# Patient Record
Sex: MALE | Race: WHITE | NOT HISPANIC OR LATINO | URBAN - METROPOLITAN AREA
[De-identification: names, ages, dates, MRNs, and addresses within clinical notes are randomized per-mention and may not be internally consistent; named-entity substitution may affect disease eponyms.]

---

## 2019-03-11 ENCOUNTER — IMPORTED ENCOUNTER (OUTPATIENT)
Dept: URBAN - METROPOLITAN AREA CLINIC 50 | Facility: CLINIC | Age: 69
End: 2019-03-11

## 2019-03-12 ENCOUNTER — IMPORTED ENCOUNTER (OUTPATIENT)
Dept: URBAN - METROPOLITAN AREA CLINIC 50 | Facility: CLINIC | Age: 69
End: 2019-03-12

## 2019-03-15 ENCOUNTER — IMPORTED ENCOUNTER (OUTPATIENT)
Dept: URBAN - METROPOLITAN AREA CLINIC 50 | Facility: CLINIC | Age: 69
End: 2019-03-15

## 2019-10-01 ENCOUNTER — IMPORTED ENCOUNTER (OUTPATIENT)
Dept: URBAN - METROPOLITAN AREA CLINIC 50 | Facility: CLINIC | Age: 69
End: 2019-10-01

## 2020-01-08 ENCOUNTER — IMPORTED ENCOUNTER (OUTPATIENT)
Dept: URBAN - METROPOLITAN AREA CLINIC 50 | Facility: CLINIC | Age: 70
End: 2020-01-08

## 2020-03-25 ENCOUNTER — IMPORTED ENCOUNTER (OUTPATIENT)
Dept: URBAN - METROPOLITAN AREA CLINIC 50 | Facility: CLINIC | Age: 70
End: 2020-03-25

## 2020-07-08 ENCOUNTER — IMPORTED ENCOUNTER (OUTPATIENT)
Dept: URBAN - METROPOLITAN AREA CLINIC 50 | Facility: CLINIC | Age: 70
End: 2020-07-08

## 2020-07-10 ENCOUNTER — IMPORTED ENCOUNTER (OUTPATIENT)
Dept: URBAN - METROPOLITAN AREA CLINIC 50 | Facility: CLINIC | Age: 70
End: 2020-07-10

## 2020-07-15 ENCOUNTER — IMPORTED ENCOUNTER (OUTPATIENT)
Dept: URBAN - METROPOLITAN AREA CLINIC 50 | Facility: CLINIC | Age: 70
End: 2020-07-15

## 2020-07-15 NOTE — PATIENT DISCUSSION
"""Hx.Myopic Lasik OU "" ""Phaco with IOL OS: 07/23/2020 Tecnis ZCB00 19.0 (ORA) Target: [facility] [place]

## 2020-07-23 ENCOUNTER — IMPORTED ENCOUNTER (OUTPATIENT)
Dept: URBAN - METROPOLITAN AREA CLINIC 50 | Facility: CLINIC | Age: 70
End: 2020-07-23

## 2020-07-23 NOTE — PATIENT DISCUSSION
"""S/P IOL OS: Tecnis ZCB00 19.0 (ORA) (Target: [place]) +ORA/Femto/Arcs +Omidria. Continue post operative instructions and drops per schedule.  """

## 2020-07-31 ENCOUNTER — IMPORTED ENCOUNTER (OUTPATIENT)
Dept: URBAN - METROPOLITAN AREA CLINIC 50 | Facility: CLINIC | Age: 70
End: 2020-07-31

## 2020-07-31 NOTE — PATIENT DISCUSSION
"""S/P IOL OS: Tecnis ZCB00 19.0 (ORA) (Target: [place]) +ORA/Femto/Arcs +Omidria. Continue post operative instructions and drops per schedule.  """

## 2020-08-06 ENCOUNTER — IMPORTED ENCOUNTER (OUTPATIENT)
Dept: URBAN - METROPOLITAN AREA CLINIC 50 | Facility: CLINIC | Age: 70
End: 2020-08-06

## 2020-08-06 PROBLEM — Z96.1: Noted: 2020-07-23

## 2020-08-06 PROBLEM — Z96.1: Noted: 2020-08-06

## 2020-08-06 NOTE — PATIENT DISCUSSION
"""S/P IOL OD: Tecnis [id number] 19. 5(ORA) @ 178Âº (Target: [place]) +ORA/Femto +Omidria. Continue post operative instructions and drops per schedule.  """

## 2020-08-11 ENCOUNTER — IMPORTED ENCOUNTER (OUTPATIENT)
Dept: URBAN - METROPOLITAN AREA CLINIC 50 | Facility: CLINIC | Age: 70
End: 2020-08-11

## 2020-08-11 NOTE — PATIENT DISCUSSION
"""S/P IOL OD: Tecnis [id number] 19. 5(ORA) @ 178Âº (Target: [place]) +ORA/Femto +Omidria. Continue post operative instructions and drops per schedule.  """

## 2020-09-11 ENCOUNTER — IMPORTED ENCOUNTER (OUTPATIENT)
Dept: URBAN - METROPOLITAN AREA CLINIC 50 | Facility: CLINIC | Age: 70
End: 2020-09-11

## 2020-12-01 ENCOUNTER — IMPORTED ENCOUNTER (OUTPATIENT)
Dept: URBAN - METROPOLITAN AREA CLINIC 50 | Facility: CLINIC | Age: 70
End: 2020-12-01

## 2020-12-22 ENCOUNTER — IMPORTED ENCOUNTER (OUTPATIENT)
Dept: URBAN - METROPOLITAN AREA CLINIC 50 | Facility: CLINIC | Age: 70
End: 2020-12-22

## 2020-12-30 ENCOUNTER — IMPORTED ENCOUNTER (OUTPATIENT)
Dept: URBAN - METROPOLITAN AREA CLINIC 50 | Facility: CLINIC | Age: 70
End: 2020-12-30

## 2021-01-27 ENCOUNTER — IMPORTED ENCOUNTER (OUTPATIENT)
Dept: URBAN - METROPOLITAN AREA CLINIC 50 | Facility: CLINIC | Age: 71
End: 2021-01-27

## 2021-02-09 ENCOUNTER — IMPORTED ENCOUNTER (OUTPATIENT)
Dept: URBAN - METROPOLITAN AREA CLINIC 50 | Facility: CLINIC | Age: 71
End: 2021-02-09

## 2021-04-17 ASSESSMENT — VISUAL ACUITY
OD_OTHER: 20/25. 20/40.
OS_BAT: 20/20
OD_BAT: 20/60-
OS_CC: J1@ 15 IN
OD_SC: 20/20-2
OD_BAT: 20/60-
OD_SC: 20/20-1
OD_OTHER: 20/25. 20/25.
OD_BAT: 20/70
OD_BAT: 20/25
OS_CC: 20/200
OS_PH: 20/40
OS_SC: 20/20
OD_SC: 20/30
OD_SC: 20/100-
OD_BAT: 20/25
OD_BAT: 20/30
OS_OTHER: 20/400.
OS_CC: J1@ 16 IN
OD_CC: J1@ 15 IN
OS_SC: 20/20-
OD_SC: 20/20-
OD_OTHER: 20/60-. >20/400.
OS_SC: 20/25
OS_SC: 20/20
OD_OTHER: 20/60-. >20/400.
OS_CC: J2
OD_OTHER: 20/70. 20/100.
OD_SC: 20/20
OS_OTHER: 20/25. 20/40.
OS_BAT: 20/30
OD_CC: J1+@ 14 IN
OS_SC: 20/100
OS_OTHER: >20/400. >20/400.
OS_SC: 20/70-2
OS_CC: J1+@ 14 IN
OD_CC: J2
OD_OTHER: 20/30. 20/70.
OS_OTHER: 20/20. 20/25.
OS_SC: 20/80
OS_SC: 20/20
OS_BAT: >20/400
OD_OTHER: 20/60-. >20/400.
OS_BAT: 20/400
OD_SC: 20/25+
OS_BAT: 20/25
OS_BAT: >20/400
OD_CC: J1@ 16 IN
OS_OTHER: 20/30. 20/70.
OD_SC: 20/25
OD_BAT: 20/60-
OS_OTHER: >20/400.
OS_SC: 20/25
OD_SC: 20/20
OD_SC: 20/30-1
OD_SC: 20/25+2

## 2021-04-17 ASSESSMENT — TONOMETRY
OS_IOP_MMHG: 16
OD_IOP_MMHG: 17
OS_IOP_MMHG: 16
OD_IOP_MMHG: 17
OS_IOP_MMHG: 16
OS_IOP_MMHG: 17
OS_IOP_MMHG: 17
OS_IOP_MMHG: 22
OD_IOP_MMHG: 16
OD_IOP_MMHG: 18
OD_IOP_MMHG: 16
OS_IOP_MMHG: 17
OD_IOP_MMHG: 18
OD_IOP_MMHG: 17
OS_IOP_MMHG: 17
OS_IOP_MMHG: 16
OD_IOP_MMHG: 16
OS_IOP_MMHG: 16
OS_IOP_MMHG: 16
OD_IOP_MMHG: 21

## 2021-05-28 ENCOUNTER — PREPPED CHART (OUTPATIENT)
Dept: URBAN - METROPOLITAN AREA CLINIC 53 | Facility: CLINIC | Age: 71
End: 2021-05-28

## 2021-06-01 ENCOUNTER — COMPREHENSIVE EXAM (OUTPATIENT)
Dept: URBAN - METROPOLITAN AREA CLINIC 53 | Facility: CLINIC | Age: 71
End: 2021-06-01

## 2021-06-01 DIAGNOSIS — H35.371: ICD-10-CM

## 2021-06-01 DIAGNOSIS — H43.813: ICD-10-CM

## 2021-06-01 PROCEDURE — 92014 COMPRE OPH EXAM EST PT 1/>: CPT

## 2021-06-01 PROCEDURE — 92134 CPTRZ OPH DX IMG PST SGM RTA: CPT

## 2021-06-01 ASSESSMENT — VISUAL ACUITY
OU_SC: J2
OS_SC: 20/20-
OD_SC: 20/25

## 2021-06-01 ASSESSMENT — TONOMETRY
OS_IOP_MMHG: 16
OD_IOP_MMHG: 16

## 2021-06-01 NOTE — PATIENT DISCUSSION
Next appointment with Dr. Josseline Combs is next week. If he notices any signs of a retinal detachment or tear advised to call and schedule sooner.

## 2021-06-01 NOTE — PATIENT DISCUSSION
Scattered DBH likely secondary to diabetes. A1c has been well controlled and will be rechecked soon. Will continue to monitor.

## 2021-06-01 NOTE — PATIENT DISCUSSION
NPDR: The patient has clinical evidence of non-proliferative diabetic retinopathy. It was explained to the patient that disease progression is common and repeat examination within 6 to 12 months to monitor for progression was advised. The patient was encouraged to continue to manage their weight, diet, exercise, blood pressure and blood glucose. The patient should continue to follow up with their primary care physician and work to optimally control their Hgb A1c.

## 2021-12-03 ENCOUNTER — COMPREHENSIVE EXAM (OUTPATIENT)
Dept: URBAN - METROPOLITAN AREA CLINIC 53 | Facility: CLINIC | Age: 71
End: 2021-12-03

## 2021-12-03 DIAGNOSIS — E11.3293: ICD-10-CM

## 2021-12-03 DIAGNOSIS — H43.813: ICD-10-CM

## 2021-12-03 DIAGNOSIS — H35.371: ICD-10-CM

## 2021-12-03 DIAGNOSIS — H35.63: ICD-10-CM

## 2021-12-03 PROCEDURE — 92134 CPTRZ OPH DX IMG PST SGM RTA: CPT

## 2021-12-03 PROCEDURE — 92014 COMPRE OPH EXAM EST PT 1/>: CPT

## 2021-12-03 ASSESSMENT — TONOMETRY
OD_IOP_MMHG: 14
OS_IOP_MMHG: 13

## 2021-12-03 ASSESSMENT — VISUAL ACUITY
OS_SC: J5
OS_SC: 20/20
OD_SC: J5
OD_SC: 20/20

## 2021-12-03 NOTE — PATIENT DISCUSSION
Next appointment with Dr. Lew Becerra is scheduled for 12/21/2021. If he notices any signs of a retinal detachment or tear advised to call and schedule sooner. Advised to keep all upcoming appointments with retina. Will have him schedule where he is seeing either Dr. Lew Becerra or us every 3 months.

## 2022-03-04 ENCOUNTER — ESTABLISHED PATIENT (OUTPATIENT)
Dept: URBAN - METROPOLITAN AREA CLINIC 53 | Facility: CLINIC | Age: 72
End: 2022-03-04

## 2022-03-04 DIAGNOSIS — H43.813: ICD-10-CM

## 2022-03-04 DIAGNOSIS — H35.63: ICD-10-CM

## 2022-03-04 DIAGNOSIS — E11.3293: ICD-10-CM

## 2022-03-04 DIAGNOSIS — H35.371: ICD-10-CM

## 2022-03-04 PROCEDURE — 92014 COMPRE OPH EXAM EST PT 1/>: CPT

## 2022-03-04 PROCEDURE — 92134 CPTRZ OPH DX IMG PST SGM RTA: CPT

## 2022-03-04 ASSESSMENT — VISUAL ACUITY
OS_SC: 20/25+1
OD_SC: 20/20-1
OU_SC: 20/20

## 2022-03-04 ASSESSMENT — TONOMETRY
OS_IOP_MMHG: 17
OD_IOP_MMHG: 17

## 2022-03-04 NOTE — PATIENT DISCUSSION
Patient was given a stable/improved visit last time around and will be seeing him in about 9 months. Will schedule him back in 3 months to get him on a being seen every 6 month by us and retina.

## 2022-06-17 ENCOUNTER — ESTABLISHED PATIENT (OUTPATIENT)
Dept: URBAN - METROPOLITAN AREA CLINIC 53 | Facility: CLINIC | Age: 72
End: 2022-06-17

## 2022-06-17 DIAGNOSIS — E11.3291: ICD-10-CM

## 2022-06-17 DIAGNOSIS — H43.813: ICD-10-CM

## 2022-06-17 DIAGNOSIS — H35.342: ICD-10-CM

## 2022-06-17 DIAGNOSIS — H35.373: ICD-10-CM

## 2022-06-17 DIAGNOSIS — E11.3212: ICD-10-CM

## 2022-06-17 DIAGNOSIS — H35.63: ICD-10-CM

## 2022-06-17 DIAGNOSIS — E11.9: ICD-10-CM

## 2022-06-17 PROCEDURE — 92014 COMPRE OPH EXAM EST PT 1/>: CPT

## 2022-06-17 PROCEDURE — 92134 CPTRZ OPH DX IMG PST SGM RTA: CPT

## 2022-06-17 ASSESSMENT — TONOMETRY
OS_IOP_MMHG: 17
OD_IOP_MMHG: 17

## 2022-06-17 ASSESSMENT — VISUAL ACUITY
OS_SC: 20/25
OD_SC: J5
OU_SC: 20/20
OD_SC: 20/25
OU_SC: J1
OS_SC: J2

## 2022-06-17 NOTE — PATIENT DISCUSSION
Patient is followed by Dr. Carlene Bolaños. At patients last visit at [facility] this was noted. Will send [facility] update.

## 2023-06-27 ENCOUNTER — COMPREHENSIVE EXAM (OUTPATIENT)
Dept: URBAN - METROPOLITAN AREA CLINIC 52 | Facility: CLINIC | Age: 73
End: 2023-06-27

## 2023-06-27 DIAGNOSIS — H35.341: ICD-10-CM

## 2023-06-27 DIAGNOSIS — E11.9: ICD-10-CM

## 2023-06-27 PROCEDURE — 92014 COMPRE OPH EXAM EST PT 1/>: CPT

## 2023-06-27 PROCEDURE — 92134 CPTRZ OPH DX IMG PST SGM RTA: CPT

## 2023-06-27 ASSESSMENT — VISUAL ACUITY
OU_SC: 20/20-1
OD_SC: 20/20
OU_SC: 20/20"16IN
OS_SC: 20/25-1

## 2023-06-27 ASSESSMENT — KERATOMETRY
OS_K2POWER_DIOPTERS: 39.25
OS_AXISANGLE2_DEGREES: 122
OD_K1POWER_DIOPTERS: 38.00
OS_AXISANGLE_DEGREES: 032
OS_K1POWER_DIOPTERS: 39.00
OD_AXISANGLE2_DEGREES: 170
OD_K2POWER_DIOPTERS: 40.25
OD_AXISANGLE_DEGREES: 080

## 2023-06-27 ASSESSMENT — TONOMETRY
OD_IOP_MMHG: 16
OS_IOP_MMHG: 18

## 2024-06-06 ENCOUNTER — APPOINTMENT (RX ONLY)
Dept: URBAN - METROPOLITAN AREA CLINIC 342 | Facility: CLINIC | Age: 74
Setting detail: DERMATOLOGY
End: 2024-06-06

## 2024-06-06 DIAGNOSIS — L91.8 OTHER HYPERTROPHIC DISORDERS OF THE SKIN: ICD-10-CM

## 2024-06-06 DIAGNOSIS — L30.0 NUMMULAR DERMATITIS: ICD-10-CM

## 2024-06-06 PROCEDURE — ? ITCH-SCRATCH CYCLE COUNSELING

## 2024-06-06 PROCEDURE — ? PRESCRIPTION

## 2024-06-06 PROCEDURE — 99204 OFFICE O/P NEW MOD 45 MIN: CPT

## 2024-06-06 PROCEDURE — ? COUNSELING

## 2024-06-06 PROCEDURE — ? PRESCRIPTION SAMPLES PROVIDED

## 2024-06-06 PROCEDURE — ? PRESCRIPTION MEDICATION MANAGEMENT

## 2024-06-06 RX ORDER — CLOBETASOL PROPIONATE 0.25 MG/G
1 CREAM TOPICAL BID
Qty: 100 | Refills: 0 | Status: ERX | COMMUNITY
Start: 2024-06-06

## 2024-06-06 RX ADMIN — CLOBETASOL PROPIONATE 1: 0.25 CREAM TOPICAL at 00:00

## 2024-06-06 ASSESSMENT — LOCATION DETAILED DESCRIPTION DERM
LOCATION DETAILED: RIGHT PROXIMAL PRETIBIAL REGION
LOCATION DETAILED: LEFT PROXIMAL PRETIBIAL REGION
LOCATION DETAILED: LEFT PROXIMAL DORSAL FOREARM
LOCATION DETAILED: RIGHT DISTAL DORSAL FOREARM

## 2024-06-06 ASSESSMENT — LOCATION ZONE DERM
LOCATION ZONE: ARM
LOCATION ZONE: LEG

## 2024-06-06 ASSESSMENT — LOCATION SIMPLE DESCRIPTION DERM
LOCATION SIMPLE: LEFT PRETIBIAL REGION
LOCATION SIMPLE: RIGHT FOREARM
LOCATION SIMPLE: RIGHT PRETIBIAL REGION
LOCATION SIMPLE: LEFT FOREARM

## 2024-06-06 ASSESSMENT — ITCH NUMERIC RATING SCALE: HOW SEVERE IS YOUR ITCHING?: 8

## 2024-06-06 NOTE — HPI: RASH
How Severe Is Your Rash?: moderate
Is This A New Presentation, Or A Follow-Up?: Rash
Additional History: Patient was given triamcinolone ointment bid x 7 days and prednisone x7 days. Was improving however medication stopped rash spread to arms.

## 2024-06-06 NOTE — PROCEDURE: PRESCRIPTION SAMPLES PROVIDED
Detail Level: Zone
Expiration Date (Optional): 3/2025
Samples Given: Impoyz
Lot/Batch Number (Optional): [id number]

## 2024-06-06 NOTE — PROCEDURE: PRESCRIPTION MEDICATION MANAGEMENT
Initiate Treatment: Impoyz 0.025 % topical cream Bid\\nQuantity: 100.0 g  Days Supply: 30\\nSig: AAA on arms and legs twice daily x 2-4 then discontinue. Do not apply to face, groin or underarms\\n\\n\\nSaran anti itch-may use as much as needed(can store in fridge), cool packs\\n\\nGentle cleansers
Discontinue Regimen: Sami spring
Samples Given: Gentle cleansers
Render In Strict Bullet Format?: No
Detail Level: Zone

## 2024-06-11 ENCOUNTER — RX ONLY (OUTPATIENT)
Age: 74
Setting detail: RX ONLY
End: 2024-06-11

## 2024-06-11 RX ORDER — CLOBETASOL PROPIONATE 0.25 MG/G
1 CREAM TOPICAL BID
Qty: 100 | Refills: 0 | Status: ERX

## 2024-07-24 ENCOUNTER — COMPREHENSIVE EXAM (OUTPATIENT)
Dept: URBAN - METROPOLITAN AREA CLINIC 52 | Facility: CLINIC | Age: 74
End: 2024-07-24

## 2024-07-24 DIAGNOSIS — E11.9: ICD-10-CM

## 2024-07-24 DIAGNOSIS — H43.813: ICD-10-CM

## 2024-07-24 DIAGNOSIS — H52.4: ICD-10-CM

## 2024-07-24 DIAGNOSIS — H35.373: ICD-10-CM

## 2024-07-24 DIAGNOSIS — H33.002: ICD-10-CM

## 2024-07-24 DIAGNOSIS — H35.341: ICD-10-CM

## 2024-07-24 DIAGNOSIS — H35.62: ICD-10-CM

## 2024-07-24 PROCEDURE — 92015 DETERMINE REFRACTIVE STATE: CPT

## 2024-07-24 PROCEDURE — 99214 OFFICE O/P EST MOD 30 MIN: CPT

## 2024-07-24 PROCEDURE — 92134 CPTRZ OPH DX IMG PST SGM RTA: CPT

## 2024-07-24 ASSESSMENT — KERATOMETRY
OD_AXISANGLE2_DEGREES: 169
OD_K2POWER_DIOPTERS: 40.00
OS_AXISANGLE_DEGREES: 31
OD_AXISANGLE_DEGREES: 79
OS_AXISANGLE2_DEGREES: 121
OD_K1POWER_DIOPTERS: 38.25
OS_K2POWER_DIOPTERS: 39.25
OS_K1POWER_DIOPTERS: 39.00

## 2024-07-24 ASSESSMENT — VISUAL ACUITY
OU_SC: J1@15
OU_SC: 20/20-1
OD_SC: 20/20-1
OS_SC: 20/25+2

## 2024-07-24 ASSESSMENT — TONOMETRY
OS_IOP_MMHG: 18
OD_IOP_MMHG: 18

## 2024-07-25 ENCOUNTER — APPOINTMENT (RX ONLY)
Dept: URBAN - METROPOLITAN AREA CLINIC 342 | Facility: CLINIC | Age: 74
Setting detail: DERMATOLOGY
End: 2024-07-25

## 2024-07-25 DIAGNOSIS — L30.0 NUMMULAR DERMATITIS: ICD-10-CM | Status: WELL CONTROLLED

## 2024-07-25 DIAGNOSIS — L82.1 OTHER SEBORRHEIC KERATOSIS: ICD-10-CM | Status: STABLE

## 2024-07-25 DIAGNOSIS — L81.8 OTHER SPECIFIED DISORDERS OF PIGMENTATION: ICD-10-CM | Status: STABLE

## 2024-07-25 DIAGNOSIS — L72.8 OTHER FOLLICULAR CYSTS OF THE SKIN AND SUBCUTANEOUS TISSUE: ICD-10-CM | Status: STABLE

## 2024-07-25 PROBLEM — D48.5 NEOPLASM OF UNCERTAIN BEHAVIOR OF SKIN: Status: ACTIVE | Noted: 2024-07-25

## 2024-07-25 PROCEDURE — ? OBSERVATION AND MEASURE

## 2024-07-25 PROCEDURE — ? COUNSELING

## 2024-07-25 PROCEDURE — ? PRESCRIPTION MEDICATION MANAGEMENT

## 2024-07-25 PROCEDURE — 11102 TANGNTL BX SKIN SINGLE LES: CPT

## 2024-07-25 PROCEDURE — ? ITCH-SCRATCH CYCLE COUNSELING

## 2024-07-25 PROCEDURE — 99213 OFFICE O/P EST LOW 20 MIN: CPT | Mod: 25

## 2024-07-25 PROCEDURE — ? BIOPSY BY SHAVE METHOD

## 2024-07-25 PROCEDURE — ? PRESCRIPTION SAMPLES PROVIDED

## 2024-07-25 ASSESSMENT — LOCATION DETAILED DESCRIPTION DERM
LOCATION DETAILED: LEFT PROXIMAL DORSAL FOREARM
LOCATION DETAILED: RIGHT DISTAL DORSAL FOREARM
LOCATION DETAILED: RIGHT DISTAL PRETIBIAL REGION
LOCATION DETAILED: LEFT PROXIMAL PRETIBIAL REGION
LOCATION DETAILED: RIGHT PROXIMAL PRETIBIAL REGION
LOCATION DETAILED: RIGHT SUPERIOR UPPER BACK
LOCATION DETAILED: SUPERIOR THORACIC SPINE
LOCATION DETAILED: LEFT DISTAL PRETIBIAL REGION

## 2024-07-25 ASSESSMENT — LOCATION SIMPLE DESCRIPTION DERM
LOCATION SIMPLE: UPPER BACK
LOCATION SIMPLE: RIGHT PRETIBIAL REGION
LOCATION SIMPLE: LEFT FOREARM
LOCATION SIMPLE: LEFT PRETIBIAL REGION
LOCATION SIMPLE: RIGHT FOREARM
LOCATION SIMPLE: RIGHT UPPER BACK

## 2024-07-25 ASSESSMENT — LOCATION ZONE DERM
LOCATION ZONE: ARM
LOCATION ZONE: TRUNK
LOCATION ZONE: LEG

## 2024-07-25 ASSESSMENT — ITCH NUMERIC RATING SCALE: HOW SEVERE IS YOUR ITCHING?: 0

## 2024-07-25 NOTE — PROCEDURE: PRESCRIPTION MEDICATION MANAGEMENT
Initiate Treatment: Moisturizing a minimum of once daily
Discontinue Regimen: Current laundry detergent
Modify Regimen: Clobetasol 0.05% cream Apply to affected areas on arms and legs twice daily as needed for flares
Samples Given: All free & clear, tide free & clear, aveeno moisturizer, CeraVe moisturizer
Render In Strict Bullet Format?: No
Detail Level: Zone
Continue Regimen: Gentle cleansers

## 2024-07-25 NOTE — PROCEDURE: BIOPSY BY SHAVE METHOD
Detail Level: Detailed
Depth Of Biopsy: dermis
Was A Bandage Applied: Yes
Size Of Lesion In Cm: 0.9
X Size Of Lesion In Cm: 0
Biopsy Type: H and E
Biopsy Method: Personna blade
Anesthesia Type: 1% lidocaine with epinephrine
Anesthesia Volume In Cc: 0.5
Hemostasis: Electrocautery and Aluminum Chloride
Wound Care: Petrolatum
Dressing: bandage
Destruction After The Procedure: No
Type Of Destruction Used: Curettage
Curettage Text: The wound bed was treated with curettage after the biopsy was performed.
Cryotherapy Text: The wound bed was treated with cryotherapy after the biopsy was performed.
Electrodesiccation Text: The wound bed was treated with electrodesiccation after the biopsy was performed.
Electrodesiccation And Curettage Text: The wound bed was treated with electrodesiccation and curettage after the biopsy was performed.
Silver Nitrate Text: The wound bed was treated with silver nitrate after the biopsy was performed.
Lab: [id number]
Lab Facility: [id number]
Consent: Written consent was obtained and risks were reviewed including but not limited to scarring, infection, bleeding, scabbing, incomplete removal, nerve damage and allergy to anesthesia.
Post-Care Instructions: I reviewed with the patient in detail post-care instructions. Patient is to keep the biopsy site dry overnight, and then apply Vaseline or aquaphor twice daily until healed. Keep biopsy site clean with soap and water.
Notification Instructions: Patient will be notified of biopsy results. However, patient instructed to call the office if not contacted within 2 weeks.
Billing Type: Third-Party Bill
Information: Selecting Yes will display possible errors in your note based on the variables you have selected. This validation is only offered as a suggestion for you. PLEASE NOTE THAT THE VALIDATION TEXT WILL BE REMOVED WHEN YOU FINALIZE YOUR NOTE. IF YOU WANT TO FAX A PRELIMINARY NOTE YOU WILL NEED TO TOGGLE THIS TO 'NO' IF YOU DO NOT WANT IT IN YOUR FAXED NOTE.

## 2024-08-22 ENCOUNTER — APPOINTMENT (RX ONLY)
Dept: URBAN - METROPOLITAN AREA CLINIC 342 | Facility: CLINIC | Age: 74
Setting detail: DERMATOLOGY
End: 2024-08-22

## 2024-08-22 DIAGNOSIS — L57.0 ACTINIC KERATOSIS: ICD-10-CM | Status: INADEQUATELY CONTROLLED

## 2024-08-22 PROCEDURE — ? LIQUID NITROGEN

## 2024-08-22 PROCEDURE — 17000 DESTRUCT PREMALG LESION: CPT

## 2024-08-22 PROCEDURE — ? COUNSELING

## 2024-08-22 ASSESSMENT — LOCATION ZONE DERM: LOCATION ZONE: HAND

## 2024-08-22 ASSESSMENT — LOCATION DETAILED DESCRIPTION DERM: LOCATION DETAILED: LEFT RADIAL DORSAL HAND

## 2024-08-22 ASSESSMENT — LOCATION SIMPLE DESCRIPTION DERM: LOCATION SIMPLE: LEFT HAND

## 2024-08-22 NOTE — PROCEDURE: LIQUID NITROGEN
Render Post-Care Instructions In Note?: no
Show Applicator Variable?: Yes
Application Tool (Optional): Liquid Nitrogen Sprayer
Detail Level: Detailed
Post-Care Instructions: I reviewed with the patient in detail post-care instructions. Patient is to wear sunprotection, and avoid picking at any of the treated lesions. Pt may apply Vaseline to crusted or scabbing areas.
Number Of Freeze-Thaw Cycles: 1 freeze-thaw cycle
Duration Of Freeze Thaw-Cycle (Seconds): 3
Consent: The patient's consent was obtained including but not limited to risks of crusting, scabbing, blistering, scarring, darker or lighter pigmentary change, recurrence, incomplete removal and infection.

## 2024-10-24 ENCOUNTER — APPOINTMENT (RX ONLY)
Dept: URBAN - METROPOLITAN AREA CLINIC 342 | Facility: CLINIC | Age: 74
Setting detail: DERMATOLOGY
End: 2024-10-24

## 2024-10-24 DIAGNOSIS — D18.0 HEMANGIOMA: ICD-10-CM | Status: STABLE

## 2024-10-24 DIAGNOSIS — L91.8 OTHER HYPERTROPHIC DISORDERS OF THE SKIN: ICD-10-CM | Status: STABLE

## 2024-10-24 DIAGNOSIS — L81.4 OTHER MELANIN HYPERPIGMENTATION: ICD-10-CM | Status: STABLE

## 2024-10-24 DIAGNOSIS — B07.8 OTHER VIRAL WARTS: ICD-10-CM | Status: STABLE

## 2024-10-24 DIAGNOSIS — B35.6 TINEA CRURIS: ICD-10-CM | Status: INADEQUATELY CONTROLLED

## 2024-10-24 DIAGNOSIS — L72.8 OTHER FOLLICULAR CYSTS OF THE SKIN AND SUBCUTANEOUS TISSUE: ICD-10-CM | Status: STABLE

## 2024-10-24 DIAGNOSIS — L21.8 OTHER SEBORRHEIC DERMATITIS: ICD-10-CM | Status: STABLE

## 2024-10-24 DIAGNOSIS — D22 MELANOCYTIC NEVI: ICD-10-CM | Status: STABLE

## 2024-10-24 DIAGNOSIS — L82.1 OTHER SEBORRHEIC KERATOSIS: ICD-10-CM | Status: STABLE

## 2024-10-24 PROBLEM — D18.01 HEMANGIOMA OF SKIN AND SUBCUTANEOUS TISSUE: Status: ACTIVE | Noted: 2024-10-24

## 2024-10-24 PROBLEM — D22.5 MELANOCYTIC NEVI OF TRUNK: Status: ACTIVE | Noted: 2024-10-24

## 2024-10-24 PROCEDURE — ? FULL BODY SKIN EXAM

## 2024-10-24 PROCEDURE — ? PRESCRIPTION

## 2024-10-24 PROCEDURE — ? COUNSELING

## 2024-10-24 PROCEDURE — 99213 OFFICE O/P EST LOW 20 MIN: CPT

## 2024-10-24 PROCEDURE — ? TREATMENT REGIMEN

## 2024-10-24 PROCEDURE — ? PRESCRIPTION MEDICATION MANAGEMENT

## 2024-10-24 PROCEDURE — ? OBSERVATION AND MEASURE

## 2024-10-24 RX ORDER — KETOCONAZOLE 20 MG/G
1 CREAM TOPICAL BID
Qty: 60 | Refills: 0 | Status: ERX | COMMUNITY
Start: 2024-10-24

## 2024-10-24 RX ADMIN — KETOCONAZOLE 1: 20 CREAM TOPICAL at 00:00

## 2024-10-24 ASSESSMENT — LOCATION SIMPLE DESCRIPTION DERM
LOCATION SIMPLE: CHEST
LOCATION SIMPLE: RIGHT FOREHEAD
LOCATION SIMPLE: LEFT FOREARM
LOCATION SIMPLE: LEFT KNEE
LOCATION SIMPLE: RIGHT FOREARM
LOCATION SIMPLE: UPPER BACK
LOCATION SIMPLE: LEFT ANTERIOR NECK
LOCATION SIMPLE: RIGHT UPPER BACK
LOCATION SIMPLE: LEFT UPPER ARM
LOCATION SIMPLE: LEFT NOSE
LOCATION SIMPLE: LEFT SHOULDER
LOCATION SIMPLE: RIGHT NOSE
LOCATION SIMPLE: GROIN
LOCATION SIMPLE: RIGHT SHOULDER
LOCATION SIMPLE: ABDOMEN
LOCATION SIMPLE: LEFT PRETIBIAL REGION

## 2024-10-24 ASSESSMENT — LOCATION DETAILED DESCRIPTION DERM
LOCATION DETAILED: EPIGASTRIC SKIN
LOCATION DETAILED: LEFT POSTERIOR SHOULDER
LOCATION DETAILED: LEFT MEDIAL PROXIMAL PRETIBIAL REGION
LOCATION DETAILED: RIGHT INFERIOR MEDIAL FOREHEAD
LOCATION DETAILED: LEFT PROXIMAL DORSAL FOREARM
LOCATION DETAILED: LEFT DISTAL POSTERIOR UPPER ARM
LOCATION DETAILED: RIGHT SUPERIOR UPPER BACK
LOCATION DETAILED: LEFT INGUINAL CREASE
LOCATION DETAILED: LEFT MEDIAL KNEE
LOCATION DETAILED: RIGHT NASAL ALAR GROOVE
LOCATION DETAILED: RIGHT INFERIOR MEDIAL UPPER BACK
LOCATION DETAILED: RIGHT POSTERIOR SHOULDER
LOCATION DETAILED: SUPERIOR THORACIC SPINE
LOCATION DETAILED: LEFT CLAVICULAR NECK
LOCATION DETAILED: RIGHT INGUINAL CREASE
LOCATION DETAILED: RIGHT PROXIMAL DORSAL FOREARM
LOCATION DETAILED: LEFT NASAL ALAR GROOVE
LOCATION DETAILED: RIGHT INFERIOR UPPER BACK
LOCATION DETAILED: UPPER STERNUM

## 2024-10-24 ASSESSMENT — LOCATION ZONE DERM
LOCATION ZONE: TRUNK
LOCATION ZONE: NOSE
LOCATION ZONE: FACE
LOCATION ZONE: ARM
LOCATION ZONE: NECK
LOCATION ZONE: LEG

## 2024-10-24 NOTE — PROCEDURE: TREATMENT REGIMEN
Initiate Treatment: Mineral Based Sunscreen with a minimum of 30 SPF. Reapplication every 2 hrs or sooner depending on activity.\\nMake sure to discard  sunscreen.
Detail Level: Zone
Plan: Reassured patient no treatment needed unless bothersome.

## 2024-10-24 NOTE — HPI: EVALUATION OF SKIN LESION(S)
Hpi Title: Evaluation of Skin Lesions
Additional History: Patient is concerned about a spot on his left upper arm

## 2024-10-24 NOTE — PROCEDURE: COUNSELING
Detail Level: Generalized
Detail Level: Zone
Detail Level: Detailed
Size Of Lesion: 1.2
Size Of Lesion: 0.5

## 2024-10-24 NOTE — PROCEDURE: PRESCRIPTION MEDICATION MANAGEMENT
Notes Recorded by Chacho Yi MD on 2/2/2018 at 5:03 PM CST  Blood sugars okay  Elevation of LDL cholesterol, borderline elevation of triglycerides. Running patient's status through [place] calculator, 12% chance of cardiac event within next 10 years which is considered higher risk.  Current recommendations would be to get the LDL cholesterol below 100 and higher risk patients. Besides low-fat diet, weight loss, regular exercise, the recommendation would be also for a statin drug such as atorvastatin 10 mg p.o. daily or rosuvastatin 10 mg p.o. daily, whichever insurance covers better  
Initiate Treatment: ketoconazole 2 % topical cream Bid\\nQuantity: 60.0 g  Days Supply: 30\\nSig: AAA in groin twice daily for 2 weeks
Detail Level: Zone
Render In Strict Bullet Format?: No
Plan: RTC if not improved with treatment

## 2025-01-28 ENCOUNTER — COMPREHENSIVE EXAM (OUTPATIENT)
Age: 75
End: 2025-01-28

## 2025-01-28 DIAGNOSIS — H35.373: ICD-10-CM

## 2025-01-28 DIAGNOSIS — E11.3312: ICD-10-CM

## 2025-01-28 DIAGNOSIS — H43.813: ICD-10-CM

## 2025-01-28 DIAGNOSIS — E11.3391: ICD-10-CM

## 2025-01-28 PROCEDURE — 92134 CPTRZ OPH DX IMG PST SGM RTA: CPT

## 2025-01-28 PROCEDURE — 99214 OFFICE O/P EST MOD 30 MIN: CPT
